# Patient Record
Sex: MALE | Race: WHITE | NOT HISPANIC OR LATINO | Employment: STUDENT | URBAN - METROPOLITAN AREA
[De-identification: names, ages, dates, MRNs, and addresses within clinical notes are randomized per-mention and may not be internally consistent; named-entity substitution may affect disease eponyms.]

---
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
## 2022-01-14 ENCOUNTER — TELEMEDICINE (OUTPATIENT)
Dept: BEHAVIORAL/MENTAL HEALTH CLINIC | Facility: CLINIC | Age: 22
End: 2022-01-14
Payer: COMMERCIAL

## 2022-01-14 DIAGNOSIS — F95.1 CHRONIC MOTOR TIC DISORDER: ICD-10-CM

## 2022-01-14 DIAGNOSIS — F41.1 GENERALIZED ANXIETY DISORDER: Primary | ICD-10-CM

## 2022-01-14 DIAGNOSIS — F40.10 SOCIAL ANXIETY DISORDER: ICD-10-CM

## 2022-01-14 PROCEDURE — 90832 PSYTX W PT 30 MINUTES: CPT | Performed by: PSYCHIATRY & NEUROLOGY

## 2022-01-14 NOTE — PSYCH
Session time [id number] (total time 23 minutes)    Virtual Regular Visit    Verification of patient location:    Patient is located in the following state in which I hold an active license [place]      Assessment/Plan:    Problem List Items Addressed This Visit        Nervous and Auditory    Chronic motor tic disorder       Other    Generalized anxiety disorder - Primary    Social anxiety disorder          Goals addressed in session: Goal 1          Reason for visit is   Chief Complaint   Patient presents with    Virtual Regular Visit        Encounter provider MANUEL Mckeon    Provider located at [address] [place] [id number] [phone]      Recent Visits  No visits were found meeting these conditions  Showing recent visits within past 7 days and meeting all other requirements  Today's Visits  Date Type Provider Dept   01/14/22 Telemedicine MANUEL Figueroa [facility] Therapist [facility]   Showing today's visits and meeting all other requirements  Future Appointments  No visits were found meeting these conditions  Showing future appointments within next 150 days and meeting all other requirements       The patient was identified by name and date of birth  Zoraida Villagran was informed that this is a telemedicine visit and that the visit is being conducted through[employer] [facility] and patient was informed that this is a secure, HIPAA-compliant platform  He agrees to proceed     My office door was closed  No one else was in the room  He acknowledged consent and understanding of privacy and security of the video platform  The patient has agreed to participate and understands they can discontinue the visit at any time  Patient is aware this is a billable service  Subjective  Zoraida Villagran is a 25 y o  male presenting for follow up   Pema Winn shared that he has been doing quite well lately, with very little anxiety  HE talked about doing better than expected in his classes last semester, so now he is finishing up his last semester at [address] this spring, and will be going to main campus starting in the fall  He said that he is relieved that the one class he had to repeat twice is over and he did well in, so that alleviated a lot of his stressors  He had a relaxing winter break, doing some traveling with his family and girlfriend, and feels refreshed and relaxed  He said that he does still have some issues with his motor tic, and would like to further discuss med options with Dr Rubina Son  Encouraged Malik Bedoya to continue to focus on positive progress, to utilize anxiety management skills as needed, and to discuss meds with Dr Rubina Son  A: Malik Bedoya presented as euthymic and calm today, with a bright affect and normal behavior throughout session  His concentration was intact, thought process logical and organized  Insight and judgment intact  Speech normal volume, rate and fluency  Denies SI HI and psychosis  Good progress toward goals  P: Malik Bedoya will call to schedule med check appointment with Dr Rubina Son, will continue with anxiety management strategies as needed, and will follow up on one month as scheduled  HPI     No past medical history on file  No past surgical history on file  Current Outpatient Medications   Medication Sig Dispense Refill    guanFACINE HCl ER 2 MG TB24 Take 1 tablet (2 mg total) by mouth daily (Patient not taking: Reported on 9/22/2021) 30 tablet 2    sertraline (ZOLOFT) 50 mg tablet Take 1 tablet (50 mg total) by mouth daily 90 tablet 1     No current facility-administered medications for this visit  No Known Allergies    Review of Systems    Video Exam    There were no vitals filed for this visit      Physical Exam     I spent 23 minutes directly with the patient during this visit    VIRTUAL VISIT Chente Melgoza [facility] verbally agrees to participate in [employer]  Pt is aware that Virtual Care Services could be limited without vital signs or the ability to perform a full hands-on physical Tj Marr understands he or the provider may request at any time to terminate the video visit and request the patient to seek care or treatment in person

## 2022-02-11 ENCOUNTER — TELEMEDICINE (OUTPATIENT)
Dept: BEHAVIORAL/MENTAL HEALTH CLINIC | Facility: CLINIC | Age: 22
End: 2022-02-11
Payer: COMMERCIAL

## 2022-02-11 DIAGNOSIS — F41.1 GENERALIZED ANXIETY DISORDER: Primary | ICD-10-CM

## 2022-02-11 DIAGNOSIS — F95.1 CHRONIC MOTOR TIC DISORDER: ICD-10-CM

## 2022-02-11 DIAGNOSIS — F40.10 SOCIAL ANXIETY DISORDER: ICD-10-CM

## 2022-02-11 PROCEDURE — 90832 PSYTX W PT 30 MINUTES: CPT | Performed by: PSYCHIATRY & NEUROLOGY

## 2022-02-11 NOTE — PSYCH
Session time [id number] (Total time 16 minutes)    Virtual Regular Visit    Verification of patient location:    Patient is located in the following state in which I hold an active license [place]      Assessment/Plan:    Problem List Items Addressed This Visit        Nervous and Auditory    Chronic motor tic disorder       Other    Generalized anxiety disorder - Primary    Social anxiety disorder          Goals addressed in session: Goal 1          Reason for visit is   Chief Complaint   Patient presents with    Virtual Regular Visit        Encounter provider MANUEL Clifford    Provider located at [address] [place] [id number] [phone]      Recent Visits  No visits were found meeting these conditions  Showing recent visits within past 7 days and meeting all other requirements  Future Appointments  No visits were found meeting these conditions  Showing future appointments within next 150 days and meeting all other requirements       The patient was identified by name and date of birth  Gavin Alegre was informed that this is a telemedicine visit and that the visit is being conducted through[employer] and patient was informed that this is a secure, HIPAA-compliant platform  He agrees to proceed     My office door was closed  No one else was in the room  He acknowledged consent and understanding of privacy and security of the video platform  The patient has agreed to participate and understands they can discontinue the visit at any time  Patient is aware this is a billable service  Subjective  Gavin Alegre is a 25 y o  male presenting for follow up  Tim Thorpe shared that he could not talk long today due to a scheduled tutoring session that he had to get to, but wanted to have a brief check in    He shared that overall he seems to be doing well, with classes being in person this semester and feeling that his coursework is more manageable when classes are in person  His semester is math-heavy, but he said that he is managing it all pretty well so far  He noted that things at home and with his girlfriend continue to be good, with the family doing more things together even though they are all busy, which has felt good  He has not noticed any improvement or worsening of his tic, but said that because he does not have to be in school for more than a class at a time (versus all day during high school), he feels that he is not as bothered by it because he can take the time he needs to "do what I need to do" to burn off some of the excess energy he feels  He expressed some concern about how he might handle that when he is living in a house with roommates next semester at the main campus, but said that he will have his car and can go for drives, or even get out for a walk if need be  Encouraged Candis Lloyd to continue to focus on his successes and his ability to cope, and to focus on ways that he can manage his physical energy  A: Candis Lloyd presented as euthymic today, with a bright affect, good eye contact and calm behavior  His concentration was intact, thought process logical and organized  Speech normal volume,rate and fluency  Insight and judgment intact  Denies SI HI and psychosis  Good progress toward goals  P: Candis Lloyd will continue to work on balancing school work and social life to manage anxiety, will take breaks to move as needed to manage his tic, and will return in two weeks for follow up  HPI     No past medical history on file  No past surgical history on file      Current Outpatient Medications   Medication Sig Dispense Refill    guanFACINE HCl ER 2 MG TB24 Take 1 tablet (2 mg total) by mouth daily (Patient not taking: Reported on 9/22/2021) 30 tablet 2    sertraline (ZOLOFT) 50 mg tablet Take 1 tablet (50 mg total) by mouth daily 90 tablet 1     No current facility-administered medications for this visit  No Known Allergies    Review of Systems    Video Exam    There were no vitals filed for this visit  Physical Exam     I spent 16 minutes directly with the patient during this visit    VIRTUAL VISIT Karyle Ogren Szydlow verbally agrees to participate in [employer]  Pt is aware that Virtual Care Services could be limited without vital signs or the ability to perform a full hands-on physical Bety Marr understands he or the provider may request at any time to terminate the video visit and request the patient to seek care or treatment in person

## 2022-02-11 NOTE — BH TREATMENT PLAN
Debra Marr  2000       Date of Initial Treatment Plan: 7/1/2020   Date of Current Treatment Plan: 02/11/22    Treatment Plan Number 5    Strengths/Personal Resources for Self Care: smart; capable with computers; likes knowing a lot of things like foreign affairs; funny    Diagnosis:   1  Generalized anxiety disorder     2  Social anxiety disorder     3  Chronic motor tic disorder         Area of Needs: lack of focus; I forget a lot of things; tics; anger outbursts      Long Term Goal 1: I want to feel more in control of my tics    Target Date: 6/11/2022  Completion Date:          Short Term Objectives for Goal 1: A: I will schedule short, regular "movement" time into my day, with alarms to remind me to stop  B: I will practice distress tolerance exercises, with deep breathing and/or guided relaxation exercises to help me delay the need to engage in my tics  C: I will take frequent breaks throughout each class/activity, in order to help me focus and relax more  D: I will continue to take medication prescribed by Dr Lyle Tyson to help manage tics  Long Term Goal 2: I want to be able to stop my outbursts and calm down    Target Date: 6/11/2022  Completion Date: N/A    Short Term Objectives for Goal 2: I will try to catch myself feeling angry, take some deep breaths and try to calm myself down    GOAL 1: Modality: Individual 2x per month   Completion Date n/a    GOAL 2: Modality: Individual 2x per month   Completion Date [address]: Diagnosis and Treatment Plan explained to Jay Sellers relates understanding diagnosis and is agreeable to Treatment Plan  Client Comments : Please share your thoughts, feelings, need and/or experiences regarding your treatment plan: n/a    **Verbal consent given by Edgardo Wagoner during session today, 2/11/2022 at  due to [address] distancing/virtual visit

## 2022-03-18 ENCOUNTER — TELEMEDICINE (OUTPATIENT)
Dept: BEHAVIORAL/MENTAL HEALTH CLINIC | Facility: CLINIC | Age: 22
End: 2022-03-18
Payer: COMMERCIAL

## 2022-03-18 DIAGNOSIS — F41.1 GENERALIZED ANXIETY DISORDER: Primary | ICD-10-CM

## 2022-03-18 DIAGNOSIS — F40.10 SOCIAL ANXIETY DISORDER: ICD-10-CM

## 2022-03-18 PROCEDURE — 90832 PSYTX W PT 30 MINUTES: CPT | Performed by: PSYCHIATRY & NEUROLOGY

## 2022-03-18 NOTE — PSYCH
Session time 5837-9882 (total time 32 minutes)    Virtual Regular Visit    Verification of patient location:    Patient is located in the following state in which I hold an active license [place]      Assessment/Plan:    Problem List Items Addressed This Visit        Other    Generalized anxiety disorder - Primary    Social anxiety disorder          Goals addressed in session: Goal 1          Reason for visit is   Chief Complaint   Patient presents with    Virtual Regular Visit        Encounter provider MANUEL Schulz    Provider located at [address] [place], [place] [address]  [facility] [id number] [phone]      Recent Visits  No visits were found meeting these conditions  Showing recent visits within past 7 days and meeting all other requirements  Future Appointments  No visits were found meeting these conditions  Showing future appointments within next 150 days and meeting all other requirements       The patient was identified by name and date of birth  Tiffany Ayers was informed that this is a telemedicine visit and that the visit is being conducted throughEpic Embedded and patient was informed this is a secure, HIPAA-complaint platform  He agrees to proceed     My office door was closed  No one else was in the room  He acknowledged consent and understanding of privacy and security of the video platform  The patient has agreed to participate and understands they can discontinue the visit at any time  Patient is aware this is a billable service  Subjective  Tiffany Ayers is a 23 y o  male presenting for follow up  Loyda Miguel shared that he caught a "spazzing" episode on video, and the episode lasted for over a minute and a half until he realized what was happening and "came to "  He said that it was the first time he actually was able to see himself on video and he found it to be more intense than he imagined    He has not been taking prescribed meds, and does believe these episodes are happening more often, so he would like to talk to Dr Danial Meredith about getting back on meds, either something new or with increased dose, etc   Zeina Maloney also said that he was looking into things that would co-occur with chronic motor tic disorder, and said that he came across things like OCD and ADHD  While he does not feel that OCD is a fit for him, he said that ADHD made a lot of sense for him  He said that for as long as he can remember, he has had significant difficulty focusing on things, whether it was school work, a teacher's lecture, or a simple conversation with someone  He said that his mind will go off in a million directions unrelated to what he is supposed to be paying attention to, and up until now just assumed that this was normal and everyone had to struggle so much to focus  It has impacted his academics to some degree (most recently having to take a math class three times to pass it because he could not focus while it was taught online)  Encouraged Zeina Maloney to consider discussing his concerns with Dr Danial Meredith to see if he would recommend an ADHD medication at this point to see if it makes a difference  A: Zeina Maloney presented as anxious today, with good eye contact, restless behavior and loud, rapid speech  His concentration was mainly intact, thought process logical and organized  Insight and judgment intact  Denies SI HI and psychosis  Slow progress toward goals  P: Zeina Maloney will seek appointment with Dr Danial Meredith for medication review, will continue to work on anxiety reduction and behavior modification strategies as discussed in the past and will return in one month for follow up  HPI     No past medical history on file  No past surgical history on file      Current Outpatient Medications   Medication Sig Dispense Refill    guanFACINE HCl ER 2 MG TB24 Take 1 tablet (2 mg total) by mouth daily (Patient not taking: Reported on 9/22/2021) 30 tablet 2    sertraline (ZOLOFT) 50 mg tablet Take 1 tablet (50 mg total) by mouth daily 90 tablet 1     No current facility-administered medications for this visit  No Known Allergies    Review of Systems    Video Exam    There were no vitals filed for this visit  Physical Exam     I spent 32 minutes directly with the patient during this visit    VIRTUAL VISIT Mitchell Marr verbally agrees to participate in [employer]  Pt is aware that Virtual Care Services could be limited without vital signs or the ability to perform a full hands-on physical Jasmin Marr understands he or the provider may request at any time to terminate the video visit and request the patient to seek care or treatment in person

## 2022-04-03 ENCOUNTER — TELEPHONE (OUTPATIENT)
Dept: OTHER | Facility: OTHER | Age: 22
End: 2022-04-03

## 2022-04-18 ENCOUNTER — TELEPHONE (OUTPATIENT)
Dept: BEHAVIORAL/MENTAL HEALTH CLINIC | Facility: CLINIC | Age: 22
End: 2022-04-18

## 2022-04-20 ENCOUNTER — TELEPHONE (OUTPATIENT)
Dept: PSYCHIATRY | Facility: CLINIC | Age: 22
End: 2022-04-20

## 2022-04-20 NOTE — TELEPHONE ENCOUNTER
Left a message for Daniella Sheehan, letting him know that Dr Per Paniagua is willing to see him and to give our office a call to schedule an appointment

## 2022-05-19 ENCOUNTER — TELEMEDICINE (OUTPATIENT)
Dept: BEHAVIORAL/MENTAL HEALTH CLINIC | Facility: CLINIC | Age: 22
End: 2022-05-19
Payer: COMMERCIAL

## 2022-05-19 DIAGNOSIS — F40.10 SOCIAL ANXIETY DISORDER: ICD-10-CM

## 2022-05-19 DIAGNOSIS — F41.1 GENERALIZED ANXIETY DISORDER: Primary | ICD-10-CM

## 2022-05-19 DIAGNOSIS — F95.1 CHRONIC MOTOR TIC DISORDER: ICD-10-CM

## 2022-05-19 PROCEDURE — 90834 PSYTX W PT 45 MINUTES: CPT | Performed by: PSYCHIATRY & NEUROLOGY

## 2022-05-19 NOTE — PSYCH
Session time [id number] [phone] [id number] (total time 40 minutes)    Virtual Regular Visit    Verification of patient location:    Patient is located in the following state in which I hold an active license [place]      Assessment/Plan:    Problem List Items Addressed This Visit        Nervous and Auditory    Chronic motor tic disorder       Other    Generalized anxiety disorder - Primary    Social anxiety disorder          Goals addressed in session: Goal 1          Reason for visit is   Chief Complaint   Patient presents with    Virtual Regular Visit        Encounter provider MANUEL Clarke    Provider located at [address] [place] [id number] [phone]      Recent Visits  No visits were found meeting these conditions  Showing recent visits within past 7 days and meeting all other requirements  Future Appointments  No visits were found meeting these conditions  Showing future appointments within next 150 days and meeting all other requirements       The patient was identified by name and date of birth  Lacey Blankenship was informed that this is a telemedicine visit and that the visit is being conducted throughEpic Embedded and patient was informed this is a secure, HIPAA-complaint platform  He agrees to proceed     My office door was closed  No one else was in the room  He acknowledged consent and understanding of privacy and security of the video platform  The patient has agreed to participate and understands they can discontinue the visit at any time  Patient is aware this is a billable service  Subjective  Lacey Blankenship is a 23 y o  male presenting for follow up  Sai Fowler shared that he finished up his semester a few weeks ago and did well, but is 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  He also shared that he just went on a trip to [place] [facility] and got back last night, and said that he was sick but very busy on the trip  He noted that while they were so busy and while he was sharing a room with other guys, he did not "spazz" and managed fine  He was able to control it until he was either in a bathroom or alone, and even then it was just a little bit  However, while he was finishing up his semester and he was anxious about exams and such, he did feel that his tic was frequent, prompting him to want to schedule an appointment with dr Ángel Calvin to discuss a change in meds to help manage it better, especially when he is at school in the fall  Encouraged Marlena Mccann to call to ask to be put on cancellation list, so that he can speak with Dr Ángel Calvin and get established on higher dose/new med prior to start of school in the fall  A: Marlena Mccann presented as "sick" today, with a somewhat constricted affect and sick appearance (coughing, sniffling)  His eye contact was good, behavior normal   Concentration was intact, thought process logical and organized  Speech somewhat loud and rapid, but normal fluency  Insight and judgment intact  Denies sI HI and psychosis  Some mod progress toward goals  Aponte Phi will work on logistics for changing major to help ease his anxiety about the fall, will try to get sooner appointment with Dr Ángel Calvin to discuss tic and medications, and will return for follow up in four weeks as scheduled  HPI     No past medical history on file  No past surgical history on file      Current Outpatient Medications   Medication Sig Dispense Refill    guanFACINE HCl ER 2 MG TB24 Take 1 tablet (2 mg total) by mouth daily (Patient not taking: Reported on 9/22/2021) 30 tablet 2    sertraline (ZOLOFT) 50 mg tablet Take 1 tablet (50 mg total) by mouth daily 90 tablet 1     No current facility-administered medications for this visit  No Known Allergies    Review of Systems    Video Exam    There were no vitals filed for this visit  Physical Exam     I spent 40 minutes directly with the patient during this visit    VIRTUAL VISIT Panchito Marr verbally agrees to participate in [employer]  Pt is aware that Virtual Care Services could be limited without vital signs or the ability to perform a full hands-on physical Jollyany Marr understands he or the provider may request at any time to terminate the video visit and request the patient to seek care or treatment in person

## 2022-06-22 ENCOUNTER — TELEPHONE (OUTPATIENT)
Dept: PSYCHIATRY | Facility: CLINIC | Age: 22
End: 2022-06-22

## 2022-06-22 NOTE — TELEPHONE ENCOUNTER
Patient cancelled his virtual therapy appointment for today, 6/22 because he has a work appointment

## 2022-07-08 ENCOUNTER — OFFICE VISIT (OUTPATIENT)
Dept: FAMILY MEDICINE CLINIC | Facility: CLINIC | Age: 22
End: 2022-07-08
Payer: COMMERCIAL

## 2022-07-08 VITALS
RESPIRATION RATE: 16 BRPM | OXYGEN SATURATION: 99 % | DIASTOLIC BLOOD PRESSURE: 64 MMHG | SYSTOLIC BLOOD PRESSURE: 104 MMHG | TEMPERATURE: 99.9 F | HEART RATE: 74 BPM | HEIGHT: 67 IN | BODY MASS INDEX: 19.26 KG/M2 | WEIGHT: 122.7 LBS

## 2022-07-08 DIAGNOSIS — Z20.822 SUSPECTED COVID-19 VIRUS INFECTION: ICD-10-CM

## 2022-07-08 DIAGNOSIS — J02.9 SORE THROAT: Primary | ICD-10-CM

## 2022-07-08 PROBLEM — F40.10 SOCIAL ANXIETY DISORDER: Status: RESOLVED | Noted: 2020-04-15 | Resolved: 2022-07-08

## 2022-07-08 PROBLEM — F41.1 GENERALIZED ANXIETY DISORDER: Status: RESOLVED | Noted: 2020-04-15 | Resolved: 2022-07-08

## 2022-07-08 PROBLEM — F32.A DEPRESSIVE DISORDER: Status: RESOLVED | Noted: 2020-04-15 | Resolved: 2022-07-08

## 2022-07-08 LAB
S PYO AG THROAT QL: NEGATIVE
SARS-COV-2 AG UPPER RESP QL IA: NEGATIVE
VALID CONTROL: NORMAL

## 2022-07-08 PROCEDURE — 87636 SARSCOV2 & INF A&B AMP PRB: CPT | Performed by: PHYSICIAN ASSISTANT

## 2022-07-08 PROCEDURE — 87811 SARS-COV-2 COVID19 W/OPTIC: CPT | Performed by: PHYSICIAN ASSISTANT

## 2022-07-08 PROCEDURE — 87880 STREP A ASSAY W/OPTIC: CPT | Performed by: PHYSICIAN ASSISTANT

## 2022-07-08 PROCEDURE — 99213 OFFICE O/P EST LOW 20 MIN: CPT | Performed by: PHYSICIAN ASSISTANT

## 2022-07-08 PROCEDURE — 87070 CULTURE OTHR SPECIMN AEROBIC: CPT | Performed by: PHYSICIAN ASSISTANT

## 2022-07-08 NOTE — PROGRESS NOTES
COVID-19 Outpatient Progress Note    Assessment/Plan:    1  Sore throat    - t/c negative, will send out to confirm  - POCT rapid strepA  - Throat culture    2  Suspected COVID-19 virus infection    - will do PCR, most likely positive, symptomatic relief, fluids, rest   - POCT Rapid Covid Ag  - Covid/Flu- Office Collect    F/u as needed     Disposition:     Patient has COVID-19 infection  Based off CDC guidelines, they were recommended to isolate for 5 days from the date of the positive test  If they remain asymptomatic, isolation may be ended followed by 5 days of wearing a mask when around othes to minimize risk of infecting others  If they have a fever, continue to stay home until fever resolves for at least 24 hours  I have spent 15 minutes directly with the patient  Greater than 50% of this time was spent in counseling/coordination of care regarding: diagnostic results, prognosis, risks and benefits of treatment options, instructions for management, patient and family education, importance of treatment compliance, risk factor reductions and impressions  Encounter provider Alfornia Dancer, PA-C    Provider located at [address] [facility]  [phone]    Recent Visits  No visits were found meeting these conditions  Showing recent visits within past 7 days and meeting all other requirements  Today's Visits  Date Type Provider Dept   07/08/22 Office Visit [facility]for[facility] Dancer[facility] PA-C [facility] today's visits and meeting all other requirements  Future Appointments  No visits were found meeting these conditions  Showing future appointments within next 150 days and meeting all other requirements     Subjective:   Glo Urbano is a 23 y o  male who is concerned about COVID-19   Patient's symptoms include fever, chills, fatigue, malaise, nasal congestion, sore throat, myalgias and headache  Patient denies rhinorrhea, anosmia, loss of taste, cough, shortness of breath, chest tightness, abdominal pain, nausea, vomiting and diarrhea  - Date of symptom onset: 7/7/2022  - Date of exposure: 7/5/2022      COVID-19 vaccination status: Fully vaccinated with booster    Exposure:   Contact with a person who is under investigation (PUI) for or who is positive for COVID-19 within the last 14 days?: Yes    Hospitalized recently for fever and/or lower respiratory symptoms?: No      Currently a healthcare worker that is involved in direct patient care?: No      Works in a special setting where the risk of COVID-19 transmission may be high? (this may include long-term care, correctional and group home facilities; homeless shelters; assisted-living facilities and group homes ): No      Resident in a special setting where the risk of COVID-19 transmission may be high? (this may include long-term care, correctional and group home facilities; homeless shelters; assisted-living facilities and group homes ): No      Take with 48 hours of covid symptoms, girlfriend and sister both positive  Nyquil every night  Lab Results   Component Value Date    SARSCOVAG Negative 07/08/2022     History reviewed  No pertinent past medical history  History reviewed  No pertinent surgical history  No current outpatient medications on file  No current facility-administered medications for this visit  No Known Allergies    Review of Systems   Constitutional: Positive for chills, fatigue and fever  HENT: Positive for congestion and sore throat  Negative for rhinorrhea  Respiratory: Negative for cough, chest tightness and shortness of breath  Gastrointestinal: Negative for abdominal pain, diarrhea, nausea and vomiting  Musculoskeletal: Positive for myalgias  Neurological: Positive for headaches       Objective:    Vitals:    07/08/22 1446   BP: 104/64   Pulse: 74   Resp: 16   Temp: 99 9 °F (37 7 °C)   SpO2: 99%   Weight: 55 7 kg (122 lb 11 2 oz)   Height: 5' 7" (1 702 m)       Physical Exam  Constitutional:       Appearance: He is well-developed  HENT:      Head: Normocephalic and atraumatic  Right Ear: Tympanic membrane and ear canal normal       Left Ear: Tympanic membrane and ear canal normal       Nose: Congestion present  No rhinorrhea  Mouth/Throat:      Mouth: Mucous membranes are moist    Eyes:      Conjunctiva/sclera: Conjunctivae normal       Pupils: Pupils are equal, round, and reactive to light  Cardiovascular:      Rate and Rhythm: Normal rate and regular rhythm  Heart sounds: Normal heart sounds  Pulmonary:      Effort: Pulmonary effort is normal       Breath sounds: Normal breath sounds  Musculoskeletal:      Cervical back: Normal range of motion and neck supple  Neurological:      General: No focal deficit present  Mental Status: He is alert and oriented to person, place, and time  Psychiatric:         Mood and Affect: Mood normal          Behavior: Behavior normal          VIRTUAL VISIT DISCLAIMER    Александрbethany Stanley verbally agrees to participate in [employer]  Pt is aware that Virtual Care Services could be limited without vital signs or the ability to perform a full hands-on physical Abel Marr understands he or the provider may request at any time to terminate the video visit and request the patient to seek care or treatment in person

## 2022-07-09 LAB
FLUAV RNA RESP QL NAA+PROBE: NEGATIVE
FLUBV RNA RESP QL NAA+PROBE: NEGATIVE
SARS-COV-2 RNA RESP QL NAA+PROBE: NEGATIVE

## 2022-07-10 LAB — BACTERIA THROAT CULT: NORMAL

## 2022-07-11 ENCOUNTER — CLINICAL SUPPORT (OUTPATIENT)
Dept: FAMILY MEDICINE CLINIC | Facility: CLINIC | Age: 22
End: 2022-07-11

## 2022-07-11 DIAGNOSIS — Z20.822 SUSPECTED COVID-19 VIRUS INFECTION: Primary | ICD-10-CM

## 2022-07-11 PROCEDURE — 87636 SARSCOV2 & INF A&B AMP PRB: CPT | Performed by: PHYSICIAN ASSISTANT

## 2022-07-12 LAB
FLUAV RNA RESP QL NAA+PROBE: NEGATIVE
FLUBV RNA RESP QL NAA+PROBE: NEGATIVE
SARS-COV-2 RNA RESP QL NAA+PROBE: POSITIVE

## 2022-08-18 ENCOUNTER — TELEMEDICINE (OUTPATIENT)
Dept: PSYCHIATRY | Facility: CLINIC | Age: 22
End: 2022-08-18
Payer: COMMERCIAL

## 2022-08-18 DIAGNOSIS — F95.2 TOURETTE'S SYNDROME: Primary | ICD-10-CM

## 2022-08-18 PROCEDURE — 90833 PSYTX W PT W E/M 30 MIN: CPT | Performed by: STUDENT IN AN ORGANIZED HEALTH CARE EDUCATION/TRAINING PROGRAM

## 2022-08-18 PROCEDURE — 99214 OFFICE O/P EST MOD 30 MIN: CPT | Performed by: STUDENT IN AN ORGANIZED HEALTH CARE EDUCATION/TRAINING PROGRAM

## 2022-08-18 RX ORDER — ARIPIPRAZOLE 5 MG/1
TABLET ORAL
Qty: 30 TABLET | Refills: 2 | Status: SHIPPED | OUTPATIENT
Start: 2022-08-18

## 2022-08-18 NOTE — PSYCH
Virtual Regular Visit    Verification of patient location:    Patient is located in the following state in which I hold an active license [place]    Assessment/Plan:    Problem List Items Addressed This Visit        Nervous and Auditory    Chronic motor tic disorder - Primary    Relevant Medications    ARIPiprazole (ABILIFY) 5 mg tablet          Reason for visit is   Chief Complaint   Patient presents with   [facility] Depression    Tics        Encounter provider Lorna Kearney MD    Provider located at [address] [address] [phone]      Recent Visits  No visits were found meeting these conditions  Showing recent visits within past 7 days and meeting all other requirements  Today's Visits  Date Type Provider Dept   08/18/22 Telemedicine Lorna Kearney MD [facility] 18 today's visits and meeting all other requirements  Future Appointments  No visits were found meeting these conditions  Showing future appointments within next 150 days and meeting all other requirements       The patient was identified by name and date of birth  Júnior Rivera was informed that this is a telemedicine visit and that the visit is being conducted through [address] and patient was informed this is a secure, HIPAA-complaint platform  He agrees to proceed     My office door was closed  No one else was in the room  He acknowledged consent and understanding of privacy and security of the video platform  The patient has agreed to participate and understands they can discontinue the visit at any time  Patient is aware this is a billable service       Psychiatric Medication Management - Linda Denton 23 y o  male MRN: [id number]    Reason for Visit:   Chief Complaint   Patient presents with    Anxiety    Depression    Tics       Subjective:    [age] y/o Caucsian male, domiciled with parents, sister (25 y/o- [employer] [address] [place] [address], brother (23 y/o- lives independently, works in finance), completed second year at [employer] (majoring in [address] significant for h/o tics, had been in outpatient therapy for about a year in elementary school, no past psychiatric hospitalizations, no past suicide attempts (suicidal gestures with a knife), no h/o self-injurious behaviors, no h/o physical aggression towards family, no significant PMH, substance abuse history significant for cannabis use, presents to [address] [place] [address] outpatient clinic with patient reporting "I am concerned about tics, concerned about possibly depression or anxiety  "      On problem-focused interview:  1  LYDIA/Social Anxiety-   He denies significant anxiety or worries  He reports that he has been managing his anxiety well  He reports that he hasn't been taking the medication for a while  He reports taking some trips with friends over the summer  He reports sleeping 6-8 hours per night        2  Depression- Patient reports that the second year of college went well  He reports he did well academically, 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  He reports some focusing problems at times  He reports his mood is "happy "      3  Tourette's Syndrome- He reports that he has had distracting tics, reports that they can go on for about 2 minutes  He reports that he still does pacing and walking  He reports that he didn't see any difference on the medication  He reports that the tics have been much worse  He reports that he has trouble focusing at work  He reports that he has tics throughout the day for most of the day  He reports that he also has a vocal tic at times  He reports that the therapy hasn't made much of a difference        Review Of Systems:     Constitutional Negative   ENT Negative   Cardiovascular Negative   Respiratory Negative   Gastrointestinal Negative   Genitourinary Negative   Musculoskeletal Negative   Integumentary Negative   Neurological Negative   Endocrine Negative     Past Medical History:   Patient Active Problem List   Diagnosis    Chronic motor tic disorder       Allergies: No Known Allergies    Past Surgical History: No past surgical history on file  Past Psychiatric History:    H/o tics, had been in outpatient therapy for about a year before 12 y/o, no past psychiatric hospitalizations, no past suicide attempts, no h/o self-injurious behaviors, no h/o physical aggression towards family   No current outpatient therapist     Past Medication Trials: Fluoxetine 20 mg daily, Sertraline 50 mg daily (ineffective), Guanfacine ER 2 mg daily (ineffective)     Family Psychiatric History:   Denies   No FH of suicide     Social History:   Lives with parents, siblings   Mother stays at home, does some focus groups, father works as  for health system   No access to firearms   No current relationships        Substance Abuse:   Cannabis- started within the past year, would use intermittently mostly at night, last use a few weeks ago  No alcohol use, no cigarette use      Traumatic History: None    The following portions of the patient's history were reviewed and updated as appropriate: allergies, current medications, past family history, past medical history, past social history, past surgical history and problem list     Objective: There were no vitals filed for this visit        Weight (last 2 days)     None          Mental status:  Appearance sitting comfortably in chair, dressed in casual clothing, adequate hygiene and grooming, cooperative with interview   Mood "happy "    Affect Appears generally euthymic, stable, mood-congruent   Speech Normal rate, rhythm, and volume   Thought Processes Linear and goal directed   Associations intact associations   Hallucinations Denies any auditory or visual hallucinations   Thought Content No passive or active suicidal or homicidal ideation, intent, or plan  Orientation Oriented to person, place, time, and situation   Recent and Remote Memory Grossly intact   Attention Span and Concentration Concentration intact   Intellect Appears to have above average Intelligence   Insight Insight intact   Judgement judgment was intact   Muscle Strength Muscle strength and tone were normal   Language Within normal limits   Fund of Knowledge Age appropriate   Pain None     PHQ-A Depression Screening                  Assessment/Plan:       Diagnoses and all orders for this visit:    Tourette's syndrome  -     ARIPiprazole (ABILIFY) 5 mg tablet; Will take half tablet for first month, then may titrate up to full tablet daily  -     Comprehensive metabolic panel; Future  -     CBC and differential; Future  -     Hemoglobin A1C; Future  -     Lipid panel; Future  -     TSH, 3rd generation with Free T4 reflex; Future          Diagnosis: 1  Generalized Anxiety Disorder, 2  Social Anxiety Disorder, 3  Unspecified Depressive Disorder, r/o MDD, 4  Unspecified Tic Disorder     20-7 y/o Caucan male, domiciled with parents, brother (19 y/o- [facility]) and sister (17 y/o) in [facility], completed first year at [employer] (majoring in [address] significant for h/o tics, had been in outpatient therapy for about a year in elementary school, no past psychiatric hospitalizations, no past suicide attempts (suicidal gestures with a knife), no h/o self-injurious behaviors, no h/o physical aggression towards family, no significant PMH, substance abuse history significant for cannabis use, [facility] with patient reporting "I am concerned about tics, concerned about possibly depression or anxiety  "      On assessment today, patient continues to have minimal mood and anxiety symptoms, continues to feel that he is having significant tics on daily basis interfering with ability to work and distracting from tasks, limited response to Guanfacine and [employer] psychosocial context of high achieving student, involved in theater, does fine socially, works as a    No current passive or active suicidal ideation, intent, or plan   Currently, patient is not an imminent risk of harm to self or others and is appropriate for outpatient level of care at this time      Plan:  1   LYDIA/Social Anxiety- Continue individual therapy intake with Ricardo Green  LYDIA-7 score of 2, minimal anxiety (5/26/20)  2  Depression-  Patient self-discontinued Sertraline   Continue individual therapy for mood symptoms   PHQ-A score of 5, mild depression (5/26/20)  3  Tourette's Syndrome-  Started Abilify 2 5 mg daily for 1 month, may titrate to Abilify 5 mg daily for Tourette's syndrome  4  Medical- No active medical issues   F/u with primary care provider for on-going medical care  5  Follow-up with this provider in 8 weeks     Risks, Benefits And Possible Side Effects Of Medications:  Risks, benefits, and possible side effects of medications explained to patient and family, they verbalize understanding and Reviewed risks/benefits and side effects of antidepressant medications including black box warning on antidepressants, patient and family verbalize understanding  Psychotherapy Provided: Supportive psychotherapy provided  Counseling was provided during the session today for 16 minutes  Medications, treatment progress and treatment plan reviewed with Gil Caldwell  Recent stressor including school stress, social difficulties, everyday stressors and ongoing anxiety discussed with Gil Caldwell  Coping strategies including getting into a good routine, improving self-esteem, increasing motivation, stress reduction, spending time with family and spending time with friends reviewed with Gil Caldwell  Reassurance and supportive therapy provided       I spent 30 minutes directly with the patient during this visit

## 2022-09-08 ENCOUNTER — TELEPHONE (OUTPATIENT)
Dept: PSYCHIATRY | Facility: CLINIC | Age: 22
End: 2022-09-08

## 2022-09-08 NOTE — TELEPHONE ENCOUNTER
NO-SHOW LETTER MAILED TO Glendy Marr    ADDRESS: [address] [place] [address] [address] [address]

## 2022-10-28 ENCOUNTER — OFFICE VISIT (OUTPATIENT)
Dept: PSYCHIATRY | Facility: CLINIC | Age: 22
End: 2022-10-28

## 2022-10-28 DIAGNOSIS — Z91.199 NO-SHOW FOR APPOINTMENT: Primary | ICD-10-CM

## 2024-01-10 ENCOUNTER — DOCUMENTATION (OUTPATIENT)
Dept: BEHAVIORAL/MENTAL HEALTH CLINIC | Facility: CLINIC | Age: 24
End: 2024-01-10

## 2024-01-10 ENCOUNTER — TELEPHONE (OUTPATIENT)
Dept: PSYCHIATRY | Facility: CLINIC | Age: 24
End: 2024-01-10

## 2024-01-10 DIAGNOSIS — F41.1 GENERALIZED ANXIETY DISORDER: ICD-10-CM

## 2024-01-10 DIAGNOSIS — F40.10 SOCIAL ANXIETY DISORDER: ICD-10-CM

## 2024-01-10 DIAGNOSIS — F95.1 CHRONIC MOTOR TIC DISORDER: Primary | ICD-10-CM

## 2024-01-10 NOTE — PROGRESS NOTES
"Psychotherapy Discharge Summary    Preferred Name: Louis Marr  YOB: 2000    Admission date to psychotherapy: 7/1/2020    Referred by: Dr. Weinstein    Presenting Problem:  Louis reports having \"tics\" his entire lifetime, where he has to move his hands by either clasping them together or rub them against each other quickly and often along with that paces or walks in circles in whatever space he is in.  He has done this as long as he can remember. He reports being able to control this better in public, but finds it much more difficult to control at home, and often will engage in the movements until he is sweating and exhausted.  He also experiences anxiety, with excessive worry, feeling on edge, irritability, along with some depression, with feeling down, decreased interest in previously pleasurable activities, oversleeping, fatigue, poor concentration and feeling bad about about himself at times. He has episodes where he gets angry at small things and will have \"outbursts\" and will snap at family members, which he notes he regrets afterward. No SI, HI or delusions or hallucinations.     Course of treatment included : individual therapy     Progress/Outcome of Treatment Goals (brief summary of course of treatment) Louis made good progress in working on managing his anxiety, particularly social anxiety and anxiety regarding school.  He also improved with managing his chronic motor tic disorder with medication management with Dr. Weinstein.    Treatment Complications (if any): none    Treatment Progress: good    Current [facility] Psychiatric Provider: none    Discharge Medications include: Abilify    Discharge Date: 1/10/2024    Discharge Diagnosis:   1. Chronic motor tic disorder        2. Generalized anxiety disorder        3. Social anxiety disorder            Criteria for Discharge: two or more unexcused absences for services    Aftercare recommendations include (include specific referral names and phone " numbers, if appropriate): return for therapy if desired    Prognosis: good

## 2024-01-11 NOTE — TELEPHONE ENCOUNTER
DISCHARGE LETTER for MANUEL Singh, JERO (certified and regular) placed in outgoing mail on 01/11/24.    Article #:  [id number]    Address:  [address] [address]

## 2024-07-25 ENCOUNTER — OFFICE VISIT (OUTPATIENT)
Dept: URGENT CARE | Facility: CLINIC | Age: 24
End: 2024-07-25
Payer: COMMERCIAL

## 2024-07-25 VITALS
SYSTOLIC BLOOD PRESSURE: 118 MMHG | HEIGHT: 67 IN | WEIGHT: 154.4 LBS | TEMPERATURE: 97.7 F | OXYGEN SATURATION: 99 % | DIASTOLIC BLOOD PRESSURE: 66 MMHG | BODY MASS INDEX: 24.23 KG/M2 | HEART RATE: 79 BPM | RESPIRATION RATE: 18 BRPM

## 2024-07-25 DIAGNOSIS — J02.9 SORE THROAT: Primary | ICD-10-CM

## 2024-07-25 LAB — S PYO AG THROAT QL: NEGATIVE

## 2024-07-25 PROCEDURE — 87070 CULTURE OTHR SPECIMN AEROBIC: CPT | Performed by: NURSE PRACTITIONER

## 2024-07-25 PROCEDURE — 99213 OFFICE O/P EST LOW 20 MIN: CPT | Performed by: NURSE PRACTITIONER

## 2024-07-25 PROCEDURE — 87880 STREP A ASSAY W/OPTIC: CPT | Performed by: NURSE PRACTITIONER

## 2024-07-25 NOTE — PROGRESS NOTES
[facility]        NAME: Louis Marr is a 24 y.o. male  : 2000    MRN: [id number]  DATE: 2024  TIME: 6:27 PM    Assessment and Plan   Sore throat [J02.9]  1. Sore throat  POCT rapid ANTIGEN strepA    Throat culture            Patient Instructions     Rapid strep is negative  Will send for culture   Follow up with PCP in 3-5 days.  Proceed to  ER if symptoms worsen.    If tests have been performed at [facility], our office will contact you with results if changes need to be made to the care plan discussed with you at the visit.  You can review your full results on [facility].    Chief Complaint     Chief Complaint   Patient presents with    Sore Throat     Started yesterday, denies fever, reports intermittent runny nose, home covid test negative         History of Present Illness       HPI  Reports sore throat x 2 days. No other symptoms. Home covid test was negative. No fever. 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 He is not sure of what, but one of them may be covid.       Review of Systems   Review of Systems   Constitutional:  Negative for chills and fever.   HENT:  Positive for sore throat. Negative for congestion and rhinorrhea.    Respiratory:  Negative for cough and wheezing.    Cardiovascular:  Negative for chest pain.   Gastrointestinal:  Negative for diarrhea and vomiting.   Neurological:  Negative for headaches.         Current Medications       Current Outpatient Medications:     ARIPiprazole (ABILIFY) 5 mg tablet, Will take half tablet for first month, then may titrate up to full tablet daily (Patient not taking: Reported on 2024), Disp: 30 tablet, Rfl: 2    Current Allergies     Allergies as of 2024    (No Known Allergies)            The following portions of the patient's history were reviewed and updated as appropriate: allergies, current medications, past family history, past medical history, past social history, past surgical history and problem list.  "    History reviewed. No pertinent past medical history.    History reviewed. No pertinent surgical history.    Family History   Problem Relation Age of Onset    No Known Problems Mother     No Known Problems Father     No Known Problems Sister     No Known Problems Brother     Alcohol abuse Neg Hx     Substance Abuse Neg Hx     Mental illness Neg Hx          Medications have been verified.        Objective   /66 (BP Location: Right arm, Patient Position: Sitting)   Pulse 79   Temp 97.7 °F (36.5 °C) (Tympanic)   Resp 18   Ht 5' 7\" (1.702 m)   Wt 70 kg (154 lb 6.4 oz)   SpO2 99%   BMI 24.18 kg/m²   No LMP for male patient.       Physical Exam     Physical Exam  Constitutional:       Appearance: He is not ill-appearing or diaphoretic.   HENT:      Right Ear: Tympanic membrane normal.      Left Ear: Tympanic membrane normal.      Nose: Rhinorrhea present.      Mouth/Throat:      Pharynx: Posterior oropharyngeal erythema present.      Tonsils: No tonsillar exudate. 0 on the right. 0 on the left.   Cardiovascular:      Rate and Rhythm: Regular rhythm.   Pulmonary:      Breath sounds: Normal breath sounds.                   " Patient seen for follow up of agitation, chart reviewed, and case discussed with treatment team.  Patient had an episode of agitation over the weekend, requiring IM prn medication.  Today, she is calm, in good spirits on the unit.  The patient has been refusing po medications, states she doesn't want to take them anymore since receiving SILVESTRE.  she states her mood is good, no SI.  The patient has been visible on the unit, social with some peers, and attending groups.  The patient reports eating and sleeping well.

## 2024-07-27 LAB — BACTERIA THROAT CULT: NORMAL

## 2024-12-16 ENCOUNTER — OFFICE VISIT (OUTPATIENT)
Dept: FAMILY MEDICINE CLINIC | Facility: CLINIC | Age: 24
End: 2024-12-16
Payer: COMMERCIAL

## 2024-12-16 VITALS
SYSTOLIC BLOOD PRESSURE: 114 MMHG | HEIGHT: 68 IN | WEIGHT: 169 LBS | TEMPERATURE: 97.8 F | DIASTOLIC BLOOD PRESSURE: 72 MMHG | RESPIRATION RATE: 16 BRPM | BODY MASS INDEX: 25.61 KG/M2 | OXYGEN SATURATION: 98 % | HEART RATE: 67 BPM

## 2024-12-16 DIAGNOSIS — H00.016 HORDEOLUM EXTERNUM OF LEFT EYE, UNSPECIFIED EYELID: Primary | ICD-10-CM

## 2024-12-16 PROCEDURE — 99213 OFFICE O/P EST LOW 20 MIN: CPT | Performed by: PHYSICIAN ASSISTANT

## 2024-12-16 RX ORDER — ERYTHROMYCIN 5 MG/G
0.5 OINTMENT OPHTHALMIC EVERY 6 HOURS SCHEDULED
Qty: 3.5 G | Refills: 0 | Status: SHIPPED | OUTPATIENT
Start: 2024-12-16 | End: 2024-12-23

## 2024-12-16 NOTE — PROGRESS NOTES
Assessment/Plan:      Stye right upper and lower lid - warm compresses, J&J shampoo, e-mycin ointment 4 x a day for 7 days    F/u as needed      Subjective:   Chief Complaint   Patient presents with    Stye     Possible stye left eye   Present for several months  Not resolving      Patient ID: Louis Marr is a 24 y.o. male.    Patient here complaining of stye on left eye for 3 months. Tried washes, warm compresses without relief. Woke up with a sharp pain 3 months ago, was huge and tender. Slowly got smaller and formed into one stye. Then a smaller one formed below. Not affecting vision.         The following portions of the patient's history were reviewed and updated as appropriate: allergies, current medications, past family history, past medical history, past social history, past surgical history, and problem list.    History reviewed. No pertinent past medical history.  History reviewed. No pertinent surgical history.  Family History   Problem Relation Age of Onset    No Known Problems Mother     No Known Problems Father     No Known Problems Sister     No Known Problems Brother     Alcohol abuse Neg Hx     Substance Abuse Neg Hx     Mental illness Neg Hx      Social History     Socioeconomic History    Marital status: Single     Spouse name: Not on file    Number of children: Not on file    Years of education: Not on file    Highest education level: Not on file   Occupational History    Not on file   Tobacco Use    Smoking status: Never    Smokeless tobacco: Never   Vaping Use    Vaping status: Former   Substance and Sexual Activity    Alcohol use: Never    Drug use: Never    Sexual activity: Not on file   Other Topics Concern    Not on file   Social History Narrative    Not on file     Social Drivers of Health     Financial Resource Strain: Not on file   Food Insecurity: Not on file   Transportation Needs: Not on file   Physical Activity: Not on file   Stress: Not on file   Social Connections: Not on file  "  Intimate Partner Violence: Not on file   Housing Stability: Not on file       Current Outpatient Medications:     ARIPiprazole (ABILIFY) 5 mg tablet, Will take half tablet for first month, then may titrate up to full tablet daily (Patient not taking: Reported on 7/25/2024), Disp: 30 tablet, Rfl: 2    Review of Systems          Objective:    Vitals:    12/16/24 1243   BP: 114/72   Pulse: 67   Resp: 16   Temp: 97.8 °F (36.6 °C)   TempSrc: Temporal   SpO2: 98%   Weight: 76.7 kg (169 lb)   Height: 5' 8\" (1.727 m)        Physical Exam  Constitutional:       Appearance: Normal appearance. He is normal weight.   HENT:      Head: Normocephalic and atraumatic.   Eyes:      Conjunctiva/sclera: Conjunctivae normal.      Comments: Left upper and lower lid centrally with erythematous papular mass 2 mm in size   Musculoskeletal:      Cervical back: Normal range of motion and neck supple.   Lymphadenopathy:      Cervical: No cervical adenopathy.   Neurological:      General: No focal deficit present.      Mental Status: He is alert and oriented to person, place, and time.   Psychiatric:         Mood and Affect: Mood normal.         Behavior: Behavior normal.         Thought Content: Thought content normal.         Judgment: Judgment normal.               "

## 2025-01-22 ENCOUNTER — NEW PATIENT (OUTPATIENT)
Dept: URBAN - METROPOLITAN AREA CLINIC 6 | Facility: CLINIC | Age: 25
End: 2025-01-22

## 2025-01-22 DIAGNOSIS — H00.024: ICD-10-CM

## 2025-01-22 PROCEDURE — 92002 INTRM OPH EXAM NEW PATIENT: CPT

## 2025-01-22 ASSESSMENT — TONOMETRY
OD_IOP_MMHG: 23
OS_IOP_MMHG: 21

## 2025-01-22 ASSESSMENT — VISUAL ACUITY
OU_SC: J1+
OD_SC: 20/20
OS_SC: 20/20

## 2025-02-27 ENCOUNTER — FOLLOW UP (OUTPATIENT)
Dept: URBAN - METROPOLITAN AREA CLINIC 6 | Facility: CLINIC | Age: 25
End: 2025-02-27

## 2025-02-27 DIAGNOSIS — H01.003: ICD-10-CM

## 2025-02-27 DIAGNOSIS — H01.006: ICD-10-CM

## 2025-02-27 DIAGNOSIS — H40.052: ICD-10-CM

## 2025-02-27 DIAGNOSIS — H00.024: ICD-10-CM

## 2025-02-27 PROCEDURE — 92012 INTRM OPH EXAM EST PATIENT: CPT | Mod: 25

## 2025-02-27 PROCEDURE — 67805 REMOVE EYELID LESIONS: CPT

## 2025-02-27 ASSESSMENT — TONOMETRY
OS_IOP_MMHG: 30
OD_IOP_MMHG: 23

## 2025-02-27 ASSESSMENT — VISUAL ACUITY
OD_SC: 20/20
OS_SC: 20/25

## 2025-06-29 ENCOUNTER — APPOINTMENT (OUTPATIENT)
Dept: LAB | Facility: CLINIC | Age: 25
End: 2025-06-29
Payer: COMMERCIAL

## 2025-06-29 ENCOUNTER — TRANSCRIBE ORDERS (OUTPATIENT)
Dept: LAB | Facility: CLINIC | Age: 25
End: 2025-06-29

## 2025-06-29 DIAGNOSIS — Z00.8 HEALTH EXAMINATION IN POPULATION SURVEYS: Primary | ICD-10-CM

## 2025-06-29 DIAGNOSIS — Z00.8 HEALTH EXAMINATION IN POPULATION SURVEYS: ICD-10-CM

## 2025-06-29 LAB
CHOLEST SERPL-MCNC: 137 MG/DL (ref ?–200)
EST. AVERAGE GLUCOSE BLD GHB EST-MCNC: 88 MG/DL
HBA1C MFR BLD: 4.7 %
HDLC SERPL-MCNC: 38 MG/DL
LDLC SERPL CALC-MCNC: 87 MG/DL (ref 0–100)
NONHDLC SERPL-MCNC: 99 MG/DL
TRIGL SERPL-MCNC: 62 MG/DL (ref ?–150)

## 2025-06-29 PROCEDURE — 80061 LIPID PANEL: CPT

## 2025-06-29 PROCEDURE — 83036 HEMOGLOBIN GLYCOSYLATED A1C: CPT

## 2025-06-29 PROCEDURE — 36415 COLL VENOUS BLD VENIPUNCTURE: CPT

## 2025-07-03 ENCOUNTER — OFFICE VISIT (OUTPATIENT)
Dept: FAMILY MEDICINE CLINIC | Facility: CLINIC | Age: 25
End: 2025-07-03
Payer: COMMERCIAL

## 2025-07-03 VITALS
BODY MASS INDEX: 24.8 KG/M2 | SYSTOLIC BLOOD PRESSURE: 126 MMHG | RESPIRATION RATE: 16 BRPM | HEART RATE: 102 BPM | DIASTOLIC BLOOD PRESSURE: 80 MMHG | WEIGHT: 158 LBS | HEIGHT: 67 IN | OXYGEN SATURATION: 99 % | TEMPERATURE: 98.6 F

## 2025-07-03 DIAGNOSIS — Z00.00 ANNUAL PHYSICAL EXAM: Primary | ICD-10-CM

## 2025-07-03 PROCEDURE — 99395 PREV VISIT EST AGE 18-39: CPT | Performed by: PHYSICIAN ASSISTANT

## 2025-07-03 NOTE — PROGRESS NOTES
Adult Annual Physical  Name: Louis Marr      : 2000      MRN: [id number]  Encounter Provider: Luci Stoner PA-C  Encounter Date: 7/3/2025   Encounter department: [facility]    :  Assessment & Plan  Annual physical exam             Preventive Screenings:  - Diabetes Screening: screening up-to-date  - Cholesterol Screening: screening up-to-date   - Hepatitis C screening: screening up-to-date   - Colon cancer screening: screening not indicated   - Lung cancer screening: screening not indicated   - Prostate cancer screening: screening not indicated     Immunizations:  - Immunizations due: Need to obtain vaccines    Counseling/Anticipatory Guidance:    - Dental health: discussed importance of regular tooth brushing, flossing, and dental visits.   - Diet: discussed recommendations for a healthy/well-balanced diet.   - Exercise: the importance of regular exercise/physical activity was discussed. Recommend exercise 3-5 times per week for at least 30 minutes.   - Injury prevention: discussed safety/seat belts, safety helmets, smoke detectors, carbon monoxide detectors, and smoking near bedding or upholstery.          History of Present Illness     Adult Annual Physical:  Patient presents for annual physical.     Diet and Physical Activity:  - Diet/Nutrition: well balanced diet.  - Exercise: strength training exercises and moderate cardiovascular exercise.    Depression Screening:  - PHQ-2 Score: 0    General Health:  - Sleep: sleeps well.  - Hearing: normal hearing bilateral ears.  - Vision: most recent eye exam < 1 year ago.  - Dental: brushes teeth twice daily and regular dental visits.     Health:    - Urinary symptoms: none.     Review of Systems   Constitutional: Negative.    HENT: Negative.     Eyes: Negative.    Respiratory: Negative.     Cardiovascular: Negative.    Gastrointestinal: Negative.    Endocrine: Negative.    Genitourinary: Negative.   "  Musculoskeletal: Negative.    Skin: Negative.    Allergic/Immunologic: Negative.    Neurological: Negative.    Hematological: Negative.    Psychiatric/Behavioral: Negative.       Medical History Reviewed by provider this encounter:     .    Objective   /80   Pulse 102   Temp 98.6 °F (37 °C) (Temporal)   Resp 16   Ht 5' 7\" (1.702 m)   Wt 71.7 kg (158 lb)   SpO2 99%   BMI 24.75 kg/m²     Physical Exam  Constitutional:       Appearance: Normal appearance. He is well-developed and normal weight.   HENT:      Head: Normocephalic and atraumatic.      Right Ear: External ear normal.      Left Ear: External ear normal.     Eyes:      Extraocular Movements: Extraocular movements intact.      Conjunctiva/sclera: Conjunctivae normal.      Pupils: Pupils are equal, round, and reactive to light.     Neck:      Thyroid: No thyromegaly.     Cardiovascular:      Rate and Rhythm: Normal rate and regular rhythm.      Pulses: Normal pulses.      Heart sounds: Normal heart sounds. No murmur heard.  Pulmonary:      Effort: Pulmonary effort is normal.      Breath sounds: Normal breath sounds. No wheezing or rales.   Abdominal:      General: Abdomen is flat. Bowel sounds are normal.      Palpations: Abdomen is soft. There is no mass.      Tenderness: There is no abdominal tenderness. There is no rebound.     Musculoskeletal:         General: Normal range of motion.      Cervical back: Normal range of motion and neck supple.   Lymphadenopathy:      Cervical: No cervical adenopathy.     Skin:     General: Skin is warm.     Neurological:      General: No focal deficit present.      Mental Status: He is alert and oriented to person, place, and time.      Cranial Nerves: No cranial nerve deficit.      Deep Tendon Reflexes: Reflexes normal.     Psychiatric:         Mood and Affect: Mood normal.         Behavior: Behavior normal.         Thought Content: Thought content normal.         Judgment: Judgment normal.         "

## (undated) RX ORDER — TOBRAMYCIN 3 MG/ML: 1 SOLUTION/ DROPS OPHTHALMIC

## (undated) RX ORDER — CEPHALEXIN 500 MG/1: 1 CAPSULE ORAL TWICE A DAY

## (undated) RX ORDER — TOBRAMYCIN AND DEXAMETHASONE 1; 3 MG/ML; MG/ML: 1 SUSPENSION/ DROPS OPHTHALMIC